# Patient Record
Sex: MALE | Race: WHITE
[De-identification: names, ages, dates, MRNs, and addresses within clinical notes are randomized per-mention and may not be internally consistent; named-entity substitution may affect disease eponyms.]

---

## 2018-02-12 ENCOUNTER — HOSPITAL ENCOUNTER (EMERGENCY)
Dept: HOSPITAL 46 - ED | Age: 83
Discharge: HOME | End: 2018-02-12
Payer: MEDICARE

## 2018-02-12 VITALS — BODY MASS INDEX: 26.52 KG/M2 | WEIGHT: 175 LBS | HEIGHT: 68 IN

## 2018-02-12 DIAGNOSIS — M25.551: Primary | ICD-10-CM

## 2018-03-03 ENCOUNTER — HOSPITAL ENCOUNTER (EMERGENCY)
Dept: HOSPITAL 46 - ED | Age: 83
Discharge: HOME | End: 2018-03-03
Payer: MEDICARE

## 2018-03-03 VITALS — WEIGHT: 175 LBS | BODY MASS INDEX: 26.52 KG/M2 | HEIGHT: 68 IN

## 2018-03-03 DIAGNOSIS — I10: ICD-10-CM

## 2018-03-03 DIAGNOSIS — K52.9: Primary | ICD-10-CM

## 2018-03-03 DIAGNOSIS — F17.200: ICD-10-CM

## 2018-03-03 DIAGNOSIS — E86.0: ICD-10-CM

## 2018-03-03 DIAGNOSIS — Z79.899: ICD-10-CM

## 2018-03-03 NOTE — XMS
Clinical Summary
  Created on: 2018
 
 MARI TURNER
 External Reference #: [id number]
 : 33
 Sex: Male
 
 Demographics
 
 
+-----------------------+----------------------+
| Address               | [address]      |
|                       | [place]  [address] |
+-----------------------+----------------------+
| Home Phone            | [phone]      |
+-----------------------+----------------------+
| Preferred Language    | Unknown              |
+-----------------------+----------------------+
| Marital Status        |               |
+-----------------------+----------------------+
| Church Affiliation | Unknown              |
+-----------------------+----------------------+
| Race                  | White                |
+-----------------------+----------------------+
| Ethnic Group          |  or    |
+-----------------------+----------------------+
 
 
 Author
 
 
+--------------+-------------------------+
| Author       | [facility] |
+--------------+-------------------------+
| Organization | [facility] |
+--------------+-------------------------+
| Address      | Unknown                 |
+--------------+-------------------------+
| Phone        | Unavailable             |
+--------------+-------------------------+
 
 
 
 Care Team Providers
 
 
+-----------------------+------+-------------+
| Care Team Member Name | Role | Phone       |
+-----------------------+------+-------------+
 PP   | Unavailable |
+-----------------------+------+-------------+
 
 
 
 Source Comments
 [facility]ATRICE is fully live on both [facility] and [facility]
 
 
 Allergies
 Not on File
 
 Current Medications
 Not on file
 
 Active Problems
 Not on file
 
 Social History
 
 
+----------------+-------+-----------+--------+------+
| Tobacco Use    | Types | Packs/Day | Years  | Date |
|                |       |           | Used   |      |
+----------------+-------+-----------+--------+------+
| Never Assessed |       |           |        |      |
+----------------+-------+-----------+--------+------+
 
 
 
+------------------+---------------+
| Sex Assigned at  | Date Recorded |
| Birth            |               |
+------------------+---------------+
| Not on file      |               |
+------------------+---------------+
 
 
 
 Plan of Treatment
 
 
+--------------------+-----------+-----------+----------+
| Health Maintenance | Due Date  | Last Done | Comments |
+--------------------+-----------+-----------+----------+
| INFLUENZA VACCINE  |  |           |          |
| (FLU SHOT)         | 7         |           |          |
+--------------------+-----------+-----------+----------+
 
 
 
 Results
 Not on filefrom Last 3 Months

## 2018-03-03 NOTE — EKG
[facility]
                                    [address]
                                  [place]  [address]
_________________________________________________________________________________________
                                                                 Signed   
 
 
Sinus rhythm with premature atrial complexes
Low voltage QRS
Incomplete right bundle branch block
T wave abnormality, consider anterior ischemia
Prolonged QT
Abnormal ECG
No previous ECGs available
Confirmed by ROSINA FERNÁNDEZ MD ([id number]) on 3/3/2018 10:33:10 PM
 
 
 
 
 
 
 
 
 
 
 
 
 
 
 
 
 
 
 
 
 
 
 
 
 
 
 
 
 
 
 
 
 
 
 
 
 
    Electronically Signed By: ROSINA FERNÁNDEZ MD  03/03/18 2233
_________________________________________________________________________________________
PATIENT NAME:     MARI TURNER A                         
MEDICAL RECORD #: [id number]                     Electrocardiogram             
          ACCT #: [id number]  
DATE OF BIRTH:   [birth year]                                       
PHYSICIAN:   ROSINA FERNÁNDEZ MD           REPORT #: [id number]
REPORT IS CONFIDENTIAL AND NOT TO BE RELEASED WITHOUT AUTHORIZATION

## 2018-03-03 NOTE — XMS
Clinical Summary
  Created on: 2018
 
 MARI TURNER
 External Reference #: [id number]
 : 33
 Sex: Male
 
 Demographics
 
 
+-----------------------+----------------------+
| Address               | [address]      |
|                       | [place]  [address] |
+-----------------------+----------------------+
| Home Phone            | [phone]      |
+-----------------------+----------------------+
| Preferred Language    | Unknown              |
+-----------------------+----------------------+
| Marital Status        |               |
+-----------------------+----------------------+
| Worship Affiliation | Unknown              |
+-----------------------+----------------------+
| Race                  | White                |
+-----------------------+----------------------+
| Ethnic Group          |  or    |
+-----------------------+----------------------+
 
 
 Author
 
 
+--------------+-------------------------+
| Author       | [facility] |
+--------------+-------------------------+
| Organization | [facility] |
+--------------+-------------------------+
| Address      | Unknown                 |
+--------------+-------------------------+
| Phone        | Unavailable             |
+--------------+-------------------------+
 
 
 
 Care Team Providers
 
 
+-----------------------+------+-------------+
| Care Team Member Name | Role | Phone       |
+-----------------------+------+-------------+
 PP   | Unavailable |
+-----------------------+------+-------------+
 
 
 
 Source Comments
 [facility]ATRICE is fully live on both [facility] and [facility]
 
 
 Allergies
 Not on File
 
 Current Medications
 Not on file
 
 Active Problems
 Not on file
 
 Social History
 
 
+----------------+-------+-----------+--------+------+
| Tobacco Use    | Types | Packs/Day | Years  | Date |
|                |       |           | Used   |      |
+----------------+-------+-----------+--------+------+
| Never Assessed |       |           |        |      |
+----------------+-------+-----------+--------+------+
 
 
 
+------------------+---------------+
| Sex Assigned at  | Date Recorded |
| Birth            |               |
+------------------+---------------+
| Not on file      |               |
+------------------+---------------+
 
 
 
 Plan of Treatment
 
 
+--------------------+-----------+-----------+----------+
| Health Maintenance | Due Date  | Last Done | Comments |
+--------------------+-----------+-----------+----------+
| INFLUENZA VACCINE  |  |           |          |
| (FLU SHOT)         | 7         |           |          |
+--------------------+-----------+-----------+----------+
 
 
 
 Results
 Not on filefrom Last 3 Months

## 2020-06-20 NOTE — EKG
[facility]
                                    [address]
                                  [place]  [address]
_________________________________________________________________________________________
                                                                 Signed   
 
 
Normal sinus rhythm with sinus arrhythmia
Incomplete right bundle branch block
Borderline ECG
When compared with ECG of 03-MAR-2018 10:20,
premature atrial complexes are no longer present
QRS axis shifted right
Non-specific change in ST segment in Inferior leads
Nonspecific T wave abnormality no longer evident in Inferior leads
T wave inversion less evident in Anterior leads
Confirmed by JO ANN TREVIÑO MD ([id number]) on 6/20/2020 8:44:35 PM
 
 
 
 
 
 
 
 
 
 
 
 
 
 
 
 
 
 
 
 
 
 
 
 
 
 
 
 
 
 
 
 
 
 
 
    Electronically Signed By: JO ANN TREVIÑO MD  06/20/20 2044
_________________________________________________________________________________________
PATIENT NAME:     MARI TURNER                         
MEDICAL RECORD #: [id number]                     Electrocardiogram             
          ACCT #: [id number]  
DATE OF BIRTH:   [birth year]                                       
PHYSICIAN:   JO ANN TREVIÑO MD                   REPORT #: [id number]
REPORT IS CONFIDENTIAL AND NOT TO BE RELEASED WITHOUT AUTHORIZATION